# Patient Record
Sex: MALE | Race: ASIAN | NOT HISPANIC OR LATINO | URBAN - METROPOLITAN AREA
[De-identification: names, ages, dates, MRNs, and addresses within clinical notes are randomized per-mention and may not be internally consistent; named-entity substitution may affect disease eponyms.]

---

## 2023-11-20 ENCOUNTER — EMERGENCY (EMERGENCY)
Facility: HOSPITAL | Age: 48
LOS: 1 days | Discharge: HOME | End: 2023-11-20
Attending: STUDENT IN AN ORGANIZED HEALTH CARE EDUCATION/TRAINING PROGRAM | Admitting: STUDENT IN AN ORGANIZED HEALTH CARE EDUCATION/TRAINING PROGRAM
Payer: MEDICAID

## 2023-11-20 VITALS
RESPIRATION RATE: 18 BRPM | DIASTOLIC BLOOD PRESSURE: 88 MMHG | TEMPERATURE: 98 F | SYSTOLIC BLOOD PRESSURE: 138 MMHG | HEART RATE: 68 BPM | OXYGEN SATURATION: 99 %

## 2023-11-20 LAB
ALBUMIN SERPL ELPH-MCNC: 4.4 G/DL — SIGNIFICANT CHANGE UP (ref 3.3–5)
ALBUMIN SERPL ELPH-MCNC: 4.4 G/DL — SIGNIFICANT CHANGE UP (ref 3.3–5)
ALP SERPL-CCNC: 65 U/L — SIGNIFICANT CHANGE UP (ref 40–120)
ALP SERPL-CCNC: 65 U/L — SIGNIFICANT CHANGE UP (ref 40–120)
ALT FLD-CCNC: 15 U/L — SIGNIFICANT CHANGE UP (ref 4–41)
ALT FLD-CCNC: 15 U/L — SIGNIFICANT CHANGE UP (ref 4–41)
ANION GAP SERPL CALC-SCNC: 15 MMOL/L — HIGH (ref 7–14)
ANION GAP SERPL CALC-SCNC: 15 MMOL/L — HIGH (ref 7–14)
AST SERPL-CCNC: 16 U/L — SIGNIFICANT CHANGE UP (ref 4–40)
AST SERPL-CCNC: 16 U/L — SIGNIFICANT CHANGE UP (ref 4–40)
BASOPHILS # BLD AUTO: 0.04 K/UL — SIGNIFICANT CHANGE UP (ref 0–0.2)
BASOPHILS # BLD AUTO: 0.04 K/UL — SIGNIFICANT CHANGE UP (ref 0–0.2)
BASOPHILS NFR BLD AUTO: 0.5 % — SIGNIFICANT CHANGE UP (ref 0–2)
BASOPHILS NFR BLD AUTO: 0.5 % — SIGNIFICANT CHANGE UP (ref 0–2)
BILIRUB SERPL-MCNC: 0.4 MG/DL — SIGNIFICANT CHANGE UP (ref 0.2–1.2)
BILIRUB SERPL-MCNC: 0.4 MG/DL — SIGNIFICANT CHANGE UP (ref 0.2–1.2)
BUN SERPL-MCNC: 15 MG/DL — SIGNIFICANT CHANGE UP (ref 7–23)
BUN SERPL-MCNC: 15 MG/DL — SIGNIFICANT CHANGE UP (ref 7–23)
CALCIUM SERPL-MCNC: 9.3 MG/DL — SIGNIFICANT CHANGE UP (ref 8.4–10.5)
CALCIUM SERPL-MCNC: 9.3 MG/DL — SIGNIFICANT CHANGE UP (ref 8.4–10.5)
CHLORIDE SERPL-SCNC: 102 MMOL/L — SIGNIFICANT CHANGE UP (ref 98–107)
CHLORIDE SERPL-SCNC: 102 MMOL/L — SIGNIFICANT CHANGE UP (ref 98–107)
CO2 SERPL-SCNC: 22 MMOL/L — SIGNIFICANT CHANGE UP (ref 22–31)
CO2 SERPL-SCNC: 22 MMOL/L — SIGNIFICANT CHANGE UP (ref 22–31)
CREAT SERPL-MCNC: 0.94 MG/DL — SIGNIFICANT CHANGE UP (ref 0.5–1.3)
CREAT SERPL-MCNC: 0.94 MG/DL — SIGNIFICANT CHANGE UP (ref 0.5–1.3)
EGFR: 101 ML/MIN/1.73M2 — SIGNIFICANT CHANGE UP
EGFR: 101 ML/MIN/1.73M2 — SIGNIFICANT CHANGE UP
EOSINOPHIL # BLD AUTO: 0.39 K/UL — SIGNIFICANT CHANGE UP (ref 0–0.5)
EOSINOPHIL # BLD AUTO: 0.39 K/UL — SIGNIFICANT CHANGE UP (ref 0–0.5)
EOSINOPHIL NFR BLD AUTO: 5.3 % — SIGNIFICANT CHANGE UP (ref 0–6)
EOSINOPHIL NFR BLD AUTO: 5.3 % — SIGNIFICANT CHANGE UP (ref 0–6)
GLUCOSE SERPL-MCNC: 86 MG/DL — SIGNIFICANT CHANGE UP (ref 70–99)
GLUCOSE SERPL-MCNC: 86 MG/DL — SIGNIFICANT CHANGE UP (ref 70–99)
HCT VFR BLD CALC: 41.9 % — SIGNIFICANT CHANGE UP (ref 39–50)
HCT VFR BLD CALC: 41.9 % — SIGNIFICANT CHANGE UP (ref 39–50)
HGB BLD-MCNC: 14 G/DL — SIGNIFICANT CHANGE UP (ref 13–17)
HGB BLD-MCNC: 14 G/DL — SIGNIFICANT CHANGE UP (ref 13–17)
IANC: 3.92 K/UL — SIGNIFICANT CHANGE UP (ref 1.8–7.4)
IANC: 3.92 K/UL — SIGNIFICANT CHANGE UP (ref 1.8–7.4)
IMM GRANULOCYTES NFR BLD AUTO: 0.3 % — SIGNIFICANT CHANGE UP (ref 0–0.9)
IMM GRANULOCYTES NFR BLD AUTO: 0.3 % — SIGNIFICANT CHANGE UP (ref 0–0.9)
LYMPHOCYTES # BLD AUTO: 2.2 K/UL — SIGNIFICANT CHANGE UP (ref 1–3.3)
LYMPHOCYTES # BLD AUTO: 2.2 K/UL — SIGNIFICANT CHANGE UP (ref 1–3.3)
LYMPHOCYTES # BLD AUTO: 29.7 % — SIGNIFICANT CHANGE UP (ref 13–44)
LYMPHOCYTES # BLD AUTO: 29.7 % — SIGNIFICANT CHANGE UP (ref 13–44)
MCHC RBC-ENTMCNC: 29.2 PG — SIGNIFICANT CHANGE UP (ref 27–34)
MCHC RBC-ENTMCNC: 29.2 PG — SIGNIFICANT CHANGE UP (ref 27–34)
MCHC RBC-ENTMCNC: 33.4 GM/DL — SIGNIFICANT CHANGE UP (ref 32–36)
MCHC RBC-ENTMCNC: 33.4 GM/DL — SIGNIFICANT CHANGE UP (ref 32–36)
MCV RBC AUTO: 87.3 FL — SIGNIFICANT CHANGE UP (ref 80–100)
MCV RBC AUTO: 87.3 FL — SIGNIFICANT CHANGE UP (ref 80–100)
MONOCYTES # BLD AUTO: 0.84 K/UL — SIGNIFICANT CHANGE UP (ref 0–0.9)
MONOCYTES # BLD AUTO: 0.84 K/UL — SIGNIFICANT CHANGE UP (ref 0–0.9)
MONOCYTES NFR BLD AUTO: 11.3 % — SIGNIFICANT CHANGE UP (ref 2–14)
MONOCYTES NFR BLD AUTO: 11.3 % — SIGNIFICANT CHANGE UP (ref 2–14)
NEUTROPHILS # BLD AUTO: 3.92 K/UL — SIGNIFICANT CHANGE UP (ref 1.8–7.4)
NEUTROPHILS # BLD AUTO: 3.92 K/UL — SIGNIFICANT CHANGE UP (ref 1.8–7.4)
NEUTROPHILS NFR BLD AUTO: 52.9 % — SIGNIFICANT CHANGE UP (ref 43–77)
NEUTROPHILS NFR BLD AUTO: 52.9 % — SIGNIFICANT CHANGE UP (ref 43–77)
NRBC # BLD: 0 /100 WBCS — SIGNIFICANT CHANGE UP (ref 0–0)
NRBC # BLD: 0 /100 WBCS — SIGNIFICANT CHANGE UP (ref 0–0)
NRBC # FLD: 0 K/UL — SIGNIFICANT CHANGE UP (ref 0–0)
NRBC # FLD: 0 K/UL — SIGNIFICANT CHANGE UP (ref 0–0)
PLATELET # BLD AUTO: 389 K/UL — SIGNIFICANT CHANGE UP (ref 150–400)
PLATELET # BLD AUTO: 389 K/UL — SIGNIFICANT CHANGE UP (ref 150–400)
POTASSIUM SERPL-MCNC: 4.1 MMOL/L — SIGNIFICANT CHANGE UP (ref 3.5–5.3)
POTASSIUM SERPL-MCNC: 4.1 MMOL/L — SIGNIFICANT CHANGE UP (ref 3.5–5.3)
POTASSIUM SERPL-SCNC: 4.1 MMOL/L — SIGNIFICANT CHANGE UP (ref 3.5–5.3)
POTASSIUM SERPL-SCNC: 4.1 MMOL/L — SIGNIFICANT CHANGE UP (ref 3.5–5.3)
PROT SERPL-MCNC: 7.2 G/DL — SIGNIFICANT CHANGE UP (ref 6–8.3)
PROT SERPL-MCNC: 7.2 G/DL — SIGNIFICANT CHANGE UP (ref 6–8.3)
RBC # BLD: 4.8 M/UL — SIGNIFICANT CHANGE UP (ref 4.2–5.8)
RBC # BLD: 4.8 M/UL — SIGNIFICANT CHANGE UP (ref 4.2–5.8)
RBC # FLD: 12.4 % — SIGNIFICANT CHANGE UP (ref 10.3–14.5)
RBC # FLD: 12.4 % — SIGNIFICANT CHANGE UP (ref 10.3–14.5)
SODIUM SERPL-SCNC: 139 MMOL/L — SIGNIFICANT CHANGE UP (ref 135–145)
SODIUM SERPL-SCNC: 139 MMOL/L — SIGNIFICANT CHANGE UP (ref 135–145)
TROPONIN T, HIGH SENSITIVITY RESULT: 6 NG/L — SIGNIFICANT CHANGE UP
WBC # BLD: 7.41 K/UL — SIGNIFICANT CHANGE UP (ref 3.8–10.5)
WBC # BLD: 7.41 K/UL — SIGNIFICANT CHANGE UP (ref 3.8–10.5)
WBC # FLD AUTO: 7.41 K/UL — SIGNIFICANT CHANGE UP (ref 3.8–10.5)
WBC # FLD AUTO: 7.41 K/UL — SIGNIFICANT CHANGE UP (ref 3.8–10.5)

## 2023-11-20 PROCEDURE — 99223 1ST HOSP IP/OBS HIGH 75: CPT

## 2023-11-20 PROCEDURE — 93010 ELECTROCARDIOGRAM REPORT: CPT

## 2023-11-20 PROCEDURE — 71046 X-RAY EXAM CHEST 2 VIEWS: CPT | Mod: 26

## 2023-11-20 NOTE — ED ADULT NURSE NOTE - NSFALLUNIVINTERV_ED_ALL_ED
Bed/Stretcher in lowest position, wheels locked, appropriate side rails in place/Call bell, personal items and telephone in reach/Instruct patient to call for assistance before getting out of bed/chair/stretcher/Non-slip footwear applied when patient is off stretcher/[place] to call system/Physically safe environment - no spills, clutter or unnecessary equipment/Purposeful proactive rounding/Room/bathroom lighting operational, light cord in reach

## 2023-11-20 NOTE — ED ADULT NURSE REASSESSMENT NOTE - NS ED NURSE REASSESS COMMENT FT1
Break RN: Report given to ZEFERINO Muniz of CDU. Patient awake and resting in stretcher; respirations even and unlabored, no signs/symptoms of acute distress. Patient denies dyspnea, shortness of breath, and chest pain. Patient denies pain and offers no complaints at this time. Safety measures in place. Patient transported in stable condition to CDU 4.

## 2023-11-20 NOTE — ED PROVIDER NOTE - PHYSICAL EXAMINATION
Const: Well-nourished, Well-developed, appearing stated age.  Eyes: no conjunctival injection, and symmetrical lids.  HEENT: Head NCAT, no lesions. Atraumatic external nose and ears.   Neck: Symmetric, trachea midline.   CVS: +S1/S2, Radial and DP pulses 2+ b/l.   RESP: Unlabored respiratory effort. Clear to auscultation bilaterally.  GI: Nontender/Nondistended, No CVA tenderness b/l.   MSK: Normocephalic/Atraumatic, Lower Extremities w/o edema b/l.   Skin: Warm, dry and intact.   Neuro: Fluent Speech. Moving all extremities.   Psych: Awake, Alert, & Oriented (AAO) x3. Appropriate mood and affect.

## 2023-11-20 NOTE — ED CDU PROVIDER INITIAL DAY NOTE - OBJECTIVE STATEMENT
48-year-old male with no endorsed past medical history, does not have a primary care doctor, presenting with chief complaint of right-sided chest pain that started about 5 to 6 days ago.  Patient does endorse a cough that is been ongoing since about a week prior as well.  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  No alleviating or worsening factors.  No associated shortness of breath, nausea, diaphoresis.  Never had pain like this before.  No other complaints at this time.    CDU Note: Agree with above. 47 y/o male no PMH presenting to ER c/o chest pain x 1 week . In ER top x 1 negative, sent to CDU for tele, trop x 2 , nuclear stress test and tele doc eval am. Pt. currently resting comfortably.

## 2023-11-20 NOTE — ED ADULT TRIAGE NOTE - CHIEF COMPLAINT QUOTE
pt c/o intermittent right sided chest pain x 1 week. states pain is worse with deep inspiration. denies SOB/cp at this time.  reports no past medical hx

## 2023-11-20 NOTE — ED CDU PROVIDER INITIAL DAY NOTE - NS ED ATTENDING STATEMENT MOD
This was a shared visit with the DENNY. I reviewed and verified the documentation and independently performed the documented:

## 2023-11-20 NOTE — ED PROVIDER NOTE - OBJECTIVE STATEMENT
48-year-old male with no endorsed past medical history, does not have a primary care doctor, presenting with chief complaint of right-sided chest pain that started about 5 to 6 days ago.  Patient does endorse a cough that is been ongoing since about a week prior as well.  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  No alleviating or worsening factors.  No associated shortness of breath, nausea, diaphoresis.  Never had pain like this before.  No other complaints at this time.

## 2023-11-20 NOTE — ED ADULT NURSE NOTE - OBJECTIVE STATEMENT
Pt a&ox3 presents for rt sided chest pain intermittent starting yesterday accompanied with shortness of breath on exertion. Pt breathing even and unlabored at rest, denies headache/dizziness, abd soft, non-tender, non-distended, skin cool dry and intact, ivl placed, labs collected and sent, will continue to monitor.

## 2023-11-20 NOTE — ED PROVIDER NOTE - CLINICAL SUMMARY MEDICAL DECISION MAKING FREE TEXT BOX
48-year-old male with no primary care follow-up presenting with chief complaint of right-sided chest pain.   on exam, vital signs stable, EKG in triage with isolated T wave inversion in lead V3.  No reproducible chest wall tenderness to palpation.  Lungs clear to auscultation.  Symptoms could be secondary to recent viral infection, given patient has no follow-up, and unknown past medical history, plan for labs to assess for cardiac cause.  PERC 0, no indication for VTE work-up at this time.  Plan for labs, troponin and reassess to dispo.  Patient may be a good candidate for CDU for stress test and echo. Virginia Philip, ED Attending

## 2023-11-20 NOTE — ED CDU PROVIDER INITIAL DAY NOTE - ATTENDING APP SHARED VISIT CONTRIBUTION OF CARE
I have personally performed a face to face medical and diagnostic evaluation of the patient. I have discussed with and reviewed the ACP's note and agree with the History, ROS, Physical Exam and MDM unless otherwise indicated. A brief summary of my personal evaluation and impression can be found below.     48-year-old male with no primary care follow-up presenting with chief complaint of right-sided chest pain.   on exam, vital signs stable, EKG in triage with isolated T wave inversion in lead V3.  No reproducible chest wall tenderness to palpation.  Lungs clear to auscultation.  Symptoms could be secondary to recent viral infection, given patient has no follow-up, and unknown past medical history, plan for labs to assess for cardiac cause.  PERC 0, no indication for VTE work-up at this time. Patient's troponin 6 twice, no other actionable findings on labs.  Currently chest pain-free.  Given T wave inversion, no primary care cardiology follow-up, will keep patient in CDU for stress test and echo.  Patient agreeable.  Reassess in the a.m. to dispo.  We will set up with cardiology follow-up. Virginia Philip, ED Attending

## 2023-11-21 VITALS
DIASTOLIC BLOOD PRESSURE: 85 MMHG | HEART RATE: 69 BPM | RESPIRATION RATE: 18 BRPM | OXYGEN SATURATION: 99 % | SYSTOLIC BLOOD PRESSURE: 134 MMHG | TEMPERATURE: 98 F

## 2023-11-21 PROCEDURE — 76376 3D RENDER W/INTRP POSTPROCES: CPT | Mod: 26,59

## 2023-11-21 PROCEDURE — 93018 CV STRESS TEST I&R ONLY: CPT | Mod: GC,MG

## 2023-11-21 PROCEDURE — 93016 CV STRESS TEST SUPVJ ONLY: CPT | Mod: GC,MG

## 2023-11-21 PROCEDURE — 93306 TTE W/DOPPLER COMPLETE: CPT | Mod: 26

## 2023-11-21 PROCEDURE — 78451 HT MUSCLE IMAGE SPECT SING: CPT | Mod: 26,MG

## 2023-11-21 PROCEDURE — G1004: CPT

## 2023-11-21 PROCEDURE — 99239 HOSP IP/OBS DSCHRG MGMT >30: CPT

## 2023-11-21 NOTE — CONSULT NOTE ADULT - SUBJECTIVE AND OBJECTIVE BOX
Cardiovascular Disease Initial Evaluation  Date of Service: 11-21-23 @ 09:46    CHIEF COMPLAINT: Chest pain    HISTORY OF PRESENT ILLNESS:    This is a 48 year old man with no reported medical history, does not have a primary care doctor, who presented to [facility] on 11/20/2023 with right-sided chest pain that started about 5 to 6 days ago.  Patient reports a cough that is been ongoing since about a week prior as well.  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  No alleviating or worsening factors.  No associated shortness of breath, nausea, diaphoresis.  Never had pain like this before.  No other complaints at this time.         Allergies  No Known Allergies        MEDICATIONS:    Reviewed      PAST MEDICAL & SURGICAL HISTORY:  No pertinent past medical history      No significant past surgical history          FAMILY HISTORY:  HTN    SOCIAL HISTORY:    The patient is a nonsmoker       REVIEW OF SYSTEMS:  See HPI, otherwise complete 14 point review of systems negative         PHYSICAL EXAM:  T(C): 36.7 (11-21-23 @ 05:30), Max: 36.8 (11-20-23 @ 16:17)  HR: 70 (11-21-23 @ 05:30) (66 - 75)  BP: 127/77 (11-21-23 @ 05:30) (103/64 - 142/92)  RR: 16 (11-21-23 @ 05:30) (16 - 18)  SpO2: 98% (11-21-23 @ 05:30) (97% - 99%)  Wt(kg): --  I&O's Summary      Appearance: No Acute Distress; resting comfortably  HEENT:  Normal oral mucosa, PERRL, EOMI	  Cardiovascular: Normal S1 S2, No JVD, No murmurs/rubs/gallops  Respiratory: Normal respiratory effort; Lungs clear to auscultation bilaterally  Gastrointestinal:  Soft, Non-tender, + BS	  Skin: No rashes, No ecchymoses, No cyanosis	  Neurologic: Non-focal; no weakness  Extremities: No clubbing, cyanosis or edema  Vascular: Peripheral pulses palpable 2+ bilaterally  Psychiatry: A & O x 3, Mood & affect appropriate    Laboratory Data:	 	    CBC Full  -  ( 20 Nov 2023 11:05 )  WBC Count : 7.41 K/uL  Hemoglobin : 14.0 g/dL  Hematocrit : 41.9 %  Platelet Count - Automated : 389 K/uL  Mean Cell Volume : 87.3 fL  Mean Cell Hemoglobin : 29.2 pg  Mean Cell Hemoglobin Concentration : 33.4 gm/dL  Auto Neutrophil # : 3.92 K/uL  Auto Lymphocyte # : 2.20 K/uL  Auto Monocyte # : 0.84 K/uL  Auto Eosinophil # : 0.39 K/uL  Auto Basophil # : 0.04 K/uL  Auto Neutrophil % : 52.9 %  Auto Lymphocyte % : 29.7 %  Auto Monocyte % : 11.3 %  Auto Eosinophil % : 5.3 %  Auto Basophil % : 0.5 %    11-20    139  |  102  |  15  ----------------------------<  86  4.1   |  22  |  0.94    Ca    9.3      20 Nov 2023 11:05    TPro  7.2  /  Alb  4.4  /  TBili  0.4  /  DBili  x   /  AST  16  /  ALT  15  /  AlkPhos  65  11-20      Interpretation of Telemetry: Sinus	    ECG:  	Sinus; LVH      Assessment: 48 year old man with no reported medical history presents with chest pain.     Plan of Care:    #Chest pain-  Atypical for angina.  Mr. Padron has ruled out for ACS and EKG is nonischemic.  Awaiting echo and NST results, as ordered by CDU.    No objection to discharge planning if the above work up is negative.    Discussed with Mr. Padron in the stress lab.       44 minutes spent on total encounter; more than 50% of the visit was spent counseling and/or coordinating care by the attending physician.   	  Jurgen Merrill MD [facility]  Cardiovascular Diseases  [phone]

## 2023-11-21 NOTE — ED CDU PROVIDER SUBSEQUENT DAY NOTE - PROGRESS NOTE DETAILS
echo normal. stress test with resting imaging normal. discussed with Dr. Merrill who cleared pt. stable for discharge

## 2023-11-21 NOTE — ED CDU PROVIDER DISPOSITION NOTE - PATIENT PORTAL LINK FT
You can access the FollowMyHealth Patient Portal offered by [facility] by registering at the following website: [email or web address]. By joining [employer]’s FollowMyHealth portal, you will also be able to view your health information using other applications (apps) compatible with our system.

## 2023-11-21 NOTE — ED CDU PROVIDER SUBSEQUENT DAY NOTE - HISTORY
49 y/o male no PMH presenting to ER c/o chest pain x 1 week . In ER top x 1 negative, sent to CDU for tele, trop x 2 , nuclear stress test and tele doc eval am. Pt. currently resting comfortably.  No events overnight.

## 2023-11-21 NOTE — ED CDU PROVIDER DISPOSITION NOTE - NSFOLLOWUPINSTRUCTIONS_ED_ALL_ED_FT
Follow up with cardiologist Dr. Merrill in 1-2 weeks.     Follow up with your PCP in 1-2 weeks.     Return to the ER if having worsening chest pain, shortness of breath, fever, nausea, dizziness, weakness.

## 2023-11-21 NOTE — ED CDU PROVIDER DISPOSITION NOTE - ATTENDING APP SHARED VISIT CONTRIBUTION OF CARE
49 yo M no reported pmhx presenting to CDU for tele monitoring, stress, and echocardiogram. Pt initially presented to the ED with c/o R chest pain x 5 days and associated cough x one week. Pain improved, troponin negative. Pt reports improved sx. Appreciate cardiology recommendations, stable for DC. EF 56% on TTE, stress test wnl. DC with return recs

## 2023-11-21 NOTE — ED CDU PROVIDER SUBSEQUENT DAY NOTE - ATTENDING APP SHARED VISIT CONTRIBUTION OF CARE
47 yo M no reported pmhx presenting to CDU for tele monitoring, stress, and echocardiogram. Pt initially presented to the ED with c/o R chest pain x 5 days and associated cough x one week. Pain improved, troponin negative. Stress, echo, tdd and likely dc home. Pt endorses improved sx today.

## 2023-11-21 NOTE — ED CDU PROVIDER DISPOSITION NOTE - CARE PROVIDER_API CALL
Jurgen Merrill.  Internal Medicine  [address] [place] [address]  Phone: [phone]  Fax: [phone]  Follow Up Time:

## 2023-11-21 NOTE — ED CDU PROVIDER DISPOSITION NOTE - CLINICAL COURSE
48-year-old male with no endorsed past medical history, does not have a primary care doctor, presenting with chief complaint of right-sided chest pain that started about 5 to 6 days ago.  Patient does endorse a cough that is been ongoing since about a week prior as well.  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  No alleviating or worsening factors.  No associated shortness of breath, nausea, diaphoresis.  Never had pain like this before.  No other complaints at this time. In ER top x 1 negative, sent to CDU for tele, trop x 2 , nuclear stress test and tele doc eval am. Pt. currently resting comfortably. on re-evaluation in the morning, pt denies chest pain or sob. echo - normal. stress test with resting imaging - small-sized, mild defect(s) in the inferior wall.. suggestive of diaphragmatic attenuation artifact. Normal myocardial perfusion scan, with no evidence of infarction or inducible ischemia. Occasional VPD's occurred during stress" discussed with Dr. Merrill and Dr. Jacobs. pt is stable for discharge.